# Patient Record
(demographics unavailable — no encounter records)

---

## 2024-10-15 NOTE — PHYSICAL EXAM
[Left] : left shoulder [5 ___] : forward flexion 5[unfilled]/5 [5___] : internal rotation 5[unfilled]/5 [] : no erythema [TWNoteComboBox7] : active forward flexion 170 degrees [de-identified] : active abduction 170 degrees [de-identified] : external rotation at 90 degrees of abduction 90 degrees [TWNoteComboBox5] : internal rotation at 90 degrees of abduction 70 degrees

## 2024-10-15 NOTE — DISCUSSION/SUMMARY
[de-identified] : 62yo M with left shoulder impingement and RC tendinitis  1) csi left shoulder today - tolerated well  2) start PT  3) cryotherapy, rest and activity modification 4) rtc 6 weeks, consider MRI if needed
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
## 2024-11-26 NOTE — PHYSICAL EXAM
[Left] : left shoulder [5 ___] : forward flexion 5[unfilled]/5 [5___] : internal rotation 5[unfilled]/5 [] : no erythema [TWNoteComboBox7] : active forward flexion 170 degrees [de-identified] : active abduction 170 degrees [de-identified] : external rotation at 90 degrees of abduction 90 degrees [TWNoteComboBox5] : internal rotation at 90 degrees of abduction 70 degrees

## 2024-11-26 NOTE — DISCUSSION/SUMMARY
[de-identified] : 64yo M with left shoulder impingement and RC tendinitis. Persistent pain, has failed conservative treatment including PT and csi.  1) csi left shoulder done on 10/15/24 with no relief  2) can continue PT  3) cryotherapy, rest and activity modification 4) will order MRI L shoulder to eval rotator cuff  5) RTC after MRI to review results   The patient was advised of the diagnosis. The natural history of the pathology was explained in full to the patient in layman's terms. All questions were answered. The risks and benefits of surgical and non-surgical treatment alternatives were explained in full to the patient.
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
## 2024-12-03 NOTE — DATA REVIEWED
[MRI] : MRI [Left] : left [Shoulder] : shoulder [Report was reviewed and noted in the chart] : The report was reviewed and noted in the chart [I independently reviewed and interpreted images and report] : I independently reviewed and interpreted images and report [I reviewed the films/CD] : I reviewed the films/CD [FreeTextEntry1] : 11/29/24: Adhesive capsulitis. AC joint djd. Superior, anterior inferior labral tearing. Biceps tendinitis.

## 2024-12-03 NOTE — DISCUSSION/SUMMARY
[de-identified] : 62yo M with left shoulder impingement. MRI of the left shoulder 11/29/24 showing rotator cuff  and biceps tendinitis, ac joint djd and labral tearing.   The patient was advised of the diagnosis. The natural history of the pathology was explained in full to the patient in layman's terms. All questions were answered. The risks and benefits of surgical and non-surgical treatment alternatives were explained in full to the patient.  Would recommend a continued course of conservative treatment at this time.   1) c/w physical therapy and hep  2) cryotherapy, rest and activity modification  3) discussed timing of repeat injection if needed 4) meloxicam rx - gi precautions reviewed  4) will rtc in March when home from [place].   Entered by Becky Zhong acting as scribe. Dr. Harvey- The documentation recorded by the scribe accurately reflects the service I personally performed and the decisions made by me.

## 2024-12-03 NOTE — PHYSICAL EXAM
[Left] : left shoulder [5 ___] : forward flexion 5[unfilled]/5 [5___] : internal rotation 5[unfilled]/5 [] : no erythema [TWNoteComboBox7] : active forward flexion 170 degrees [de-identified] : active abduction 170 degrees [de-identified] : external rotation at 90 degrees of abduction 90 degrees [TWNoteComboBox5] : internal rotation at 90 degrees of abduction 70 degrees
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
